# Patient Record
Sex: FEMALE | Race: WHITE | Employment: UNEMPLOYED | URBAN - METROPOLITAN AREA
[De-identification: names, ages, dates, MRNs, and addresses within clinical notes are randomized per-mention and may not be internally consistent; named-entity substitution may affect disease eponyms.]

---
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
## 2020-03-26 ENCOUNTER — VIRTUAL VISIT (OUTPATIENT)
Dept: FAMILY MEDICINE | Facility: OTHER | Age: 20
End: 2020-03-26

## 2020-03-26 NOTE — PROGRESS NOTES
"Date: 2020 11:20:08  Clinician: Hay Murillo  Clinician NPI: [id number]  Patient: Nikki Lopez  Patient : 2000  Patient Address: [facility] [place] [address]  Patient Phone: [phone]  Visit Protocol: URI  Patient Summary:  Nikki is a 19 year old ( : 2000 ) female who initiated a Visit for COVID-19 (Coronavirus) evaluation and screening. When asked the question \"Please sign me up to receive news, health information and promotions from [employer].\", Nikki responded \"Yes\".    Nikki states her symptoms started gradually 7-9 days ago. After her symptoms started, they improved and then got worse again.   Her symptoms consist of rhinitis, wheezing, a sore throat, a cough, nasal congestion, malaise, enlarged lymph nodes, a headache, facial pain or pressure, myalgia, and chills. Nikki also feels feverish.   Symptom details     Nasal secretions: The color of her mucus is clear.    Cough: Nikki coughs every 5-10 minutes and her cough is more bothersome at night. Phlegm does not come into her throat when she coughs. She does not believe her cough is caused by post-nasal drip.     Sore throat: Nikki reports having severe throat pain (7-9 on a 10 point pain scale), does not have exudate on her tonsils, and can swallow liquids. The lymph nodes in her neck are enlarged. A rash has not appeared on the skin since the sore throat started.     Temperature: Her current temperature is 97.5 degrees Fahrenheit.     Wheezing: Nikki has been diagnosed with asthma. The wheezing interferes with her normal daily activities.    Facial pain or pressure: The facial pain or pressure does not feel worse when bending or leaning forward.     Headache: She states the headache is moderate (4-6 on a 10 point pain scale).      Nikki denies having ear pain and teeth pain. She also denies taking antibiotic medication for the symptoms, having recent facial or sinus surgery in the past 60 days, and " having a sinus infection within the past year.   Precipitating events  Within the past week, Nikki has not been exposed to someone with strep throat. She has not recently been exposed to someone with influenza. Nikki has not been in close contact with any high risk individuals.   Pertinent COVID-19 (Coronavirus) information  Nikki has not traveled internationally or to the areas where COVID-19 (Coronavirus) is widespread, including cruise ship travel in the last 14 days before the start of her symptoms.   Nikki has not had a close contact with a laboratory-confirmed COVID-19 patient within 14 days of symptom onset. She has had a close contact with a suspected COVID-19 patient within 14 days of symptom onset. Additional information about contact with COVID-19 (Coronavirus) patient as reported by the patient (free text): Patient travelled to [place], and i was in close contact with him after his return. Patient did online screening for corona, and says he has it, but has not been tested in a laboratory.   Nikki is not a healthcare worker or a  and does not work in a healthcare facility. She does not live with a healthcare worker.   Triage Point(s) temporarily suspended for COVID-19 (Coronavirus) screening  Nikki reported the following symptoms which were previously protocol referral points. These protocol referral points have temporarily been removed for purposes of COVID-19 (Coronavirus) screening.     Difficulty breathing even when resting and can only speak in phrase(s)    Wheezing that keeps Nikki from doing daily activities     Pertinent medical history  Nikki does not get yeast infections when she takes antibiotics.   Nikki does not need a return to work/school note.   Weight: 130 lbs   Nikki does not smoke or use smokeless tobacco.   She denies pregnancy and denies breastfeeding. She has menstruated in the past month.   Weight: 130 lbs    MEDICATIONS: No current  medications, ALLERGIES: NKDA  Clinician Response:  Dear Nikki,  Based on the information provided, you have a viral upper respiratory infection, otherwise known as a cold. Symptoms vary from person to person, but can include sneezing, coughing, a runny nose, sore throat, and headache and range from mild to severe.  Unfortunately, there are no medications that can cure a cold, so treatment is focused on controlling symptoms as much as possible. Most people gradually feel better until symptoms are gone in 1-2 weeks.  Medication information  Because you have a viral infection, antibiotics will not help you get better. Treating a viral infection with antibiotics could actually make you feel worse.  Self care  Steps you can take to be as comfortable as possible:     Rest.    Drink plenty of water and other liquids.    Take a hot shower to loosen congestion    Use throat lozenges.    Gargle with warm salt water (1/4 teaspoon of salt per 8 ounce glass of water).    Suck on frozen items such as popsicles or ice cubes.    Drink hot tea with lemon and honey.    Take a spoonful of honey to reduce your cough.     When to seek care  Please be seen in a clinic or urgent care if new symptoms develop, or symptoms become worse.  Call 911 or go to the emergency room if you feel that your throat is closing off, you suddenly develop a rash, you are unable to swallow fluids, you are drooling, or you are having difficulty breathing.  Additional treatment plan   Based on the information you have provided about potential exposure to Coronavirus (Covid-19) but without any symptoms of the virus (cough, fever, shortness of breath are the most common symptoms), it does not appear you need Coronavirus (COVID-19) testing at this time.   But your possible exposure to Coronavirus means that we do recommend self-isolation for 14 days from the last day you may have been exposed.   What does this mean?  Isolate Yourself:   Isolate yourself at home.    Do Not allow any visitors  Do Not go to work or school  Do Not go to [employer],  centers, shopping, or other public places.  Do Not shake hands.  Avoid close contact with others (hugging, kissing).   Protect Others:   Cover Your Mouth and Nose with a mask, disposable tissue or wash cloth to avoid spreading germs to others.  Wash your hands and face frequently with soap and water.   If you have not developed a cough with fever by day 15 of isolation you are considered uninfected.  Thank you for limiting contact with others, wearing a simple mask to cover your cough, practice good hand hygiene habits and accessing our virtual services where possible to limit the spread of this virus.  For more information about COVID19 and options for caring for yourself at home, please visit the CDC website at https://www.cdc.gov/coronavirus/2019-ncov/about/steps-when-sick.html  For more options for care at [facility], please visit our website at [email or web address]    Diagnosis: Cough  Diagnosis ICD: R05

## 2020-06-30 ENCOUNTER — HOSPITAL ENCOUNTER (INPATIENT)
Facility: CLINIC | Age: 20
LOS: 2 days | Discharge: HOME | End: 2020-07-02
Attending: EMERGENCY MEDICINE | Admitting: HOSPITALIST
Payer: COMMERCIAL

## 2020-06-30 ENCOUNTER — APPOINTMENT (OUTPATIENT)
Dept: CT IMAGING | Facility: CLINIC | Age: 20
End: 2020-06-30
Attending: EMERGENCY MEDICINE
Payer: COMMERCIAL

## 2020-06-30 DIAGNOSIS — R00.0 TACHYCARDIA: ICD-10-CM

## 2020-06-30 DIAGNOSIS — R11.2 NAUSEA AND VOMITING, INTRACTABILITY OF VOMITING NOT SPECIFIED, UNSPECIFIED VOMITING TYPE: ICD-10-CM

## 2020-06-30 DIAGNOSIS — R59.0 LYMPHADENOPATHY, MESENTERIC: ICD-10-CM

## 2020-06-30 DIAGNOSIS — N39.0 URINARY TRACT INFECTION WITH HEMATURIA, SITE UNSPECIFIED: ICD-10-CM

## 2020-06-30 DIAGNOSIS — A41.9 SEPSIS, DUE TO UNSPECIFIED ORGANISM, UNSPECIFIED WHETHER ACUTE ORGAN DYSFUNCTION PRESENT (H): ICD-10-CM

## 2020-06-30 DIAGNOSIS — R31.9 URINARY TRACT INFECTION WITH HEMATURIA, SITE UNSPECIFIED: ICD-10-CM

## 2020-06-30 DIAGNOSIS — D72.829 LEUKOCYTOSIS, UNSPECIFIED TYPE: ICD-10-CM

## 2020-06-30 LAB
ALBUMIN SERPL-MCNC: 4.2 G/DL (ref 3.4–5)
ALBUMIN UR-MCNC: 30 MG/DL
ALBUMIN UR-MCNC: 30 MG/DL
ALP SERPL-CCNC: 76 U/L (ref 40–150)
ALT SERPL W P-5'-P-CCNC: 27 U/L (ref 0–50)
ANION GAP SERPL CALCULATED.3IONS-SCNC: 8 MMOL/L (ref 3–14)
APPEARANCE UR: ABNORMAL
APPEARANCE UR: CLEAR
AST SERPL W P-5'-P-CCNC: 15 U/L (ref 0–35)
BACTERIA #/AREA URNS HPF: ABNORMAL /HPF
BASOPHILS # BLD AUTO: 0 10E9/L (ref 0–0.2)
BASOPHILS NFR BLD AUTO: 0.1 %
BILIRUB SERPL-MCNC: 0.9 MG/DL (ref 0.2–1.3)
BILIRUB UR QL STRIP: NEGATIVE
BILIRUB UR QL STRIP: NEGATIVE
BUN SERPL-MCNC: 14 MG/DL (ref 7–30)
CALCIUM SERPL-MCNC: 9.3 MG/DL (ref 8.5–10.1)
CHLORIDE SERPL-SCNC: 106 MMOL/L (ref 96–110)
CO2 SERPL-SCNC: 23 MMOL/L (ref 20–32)
COLOR UR AUTO: YELLOW
COLOR UR AUTO: YELLOW
CREAT SERPL-MCNC: 0.79 MG/DL (ref 0.5–1)
DIFFERENTIAL METHOD BLD: ABNORMAL
EOSINOPHIL # BLD AUTO: 0.1 10E9/L (ref 0–0.7)
EOSINOPHIL NFR BLD AUTO: 0.2 %
ERYTHROCYTE [DISTWIDTH] IN BLOOD BY AUTOMATED COUNT: 12.3 % (ref 10–15)
GFR SERPL CREATININE-BSD FRML MDRD: >90 ML/MIN/{1.73_M2}
GLUCOSE SERPL-MCNC: 128 MG/DL (ref 70–99)
GLUCOSE UR STRIP-MCNC: NEGATIVE MG/DL
GLUCOSE UR STRIP-MCNC: NEGATIVE MG/DL
HCG SERPL QL: NEGATIVE
HCT VFR BLD AUTO: 41.9 % (ref 35–47)
HGB BLD-MCNC: 14.4 G/DL (ref 11.7–15.7)
HGB UR QL STRIP: ABNORMAL
HGB UR QL STRIP: ABNORMAL
HYALINE CASTS #/AREA URNS LPF: 4 /LPF (ref 0–2)
IMM GRANULOCYTES # BLD: 0.1 10E9/L (ref 0–0.4)
IMM GRANULOCYTES NFR BLD: 0.4 %
INTERPRETATION ECG - MUSE: NORMAL
KETONES UR STRIP-MCNC: 10 MG/DL
KETONES UR STRIP-MCNC: NEGATIVE MG/DL
LACTATE BLD-SCNC: 1.2 MMOL/L (ref 0.7–2)
LEUKOCYTE ESTERASE UR QL STRIP: ABNORMAL
LEUKOCYTE ESTERASE UR QL STRIP: ABNORMAL
LYMPHOCYTES # BLD AUTO: 0.2 10E9/L (ref 0.8–5.3)
LYMPHOCYTES NFR BLD AUTO: 0.9 %
MCH RBC QN AUTO: 29 PG (ref 26.5–33)
MCHC RBC AUTO-ENTMCNC: 34.4 G/DL (ref 31.5–36.5)
MCV RBC AUTO: 85 FL (ref 78–100)
MONOCYTES # BLD AUTO: 0.7 10E9/L (ref 0–1.3)
MONOCYTES NFR BLD AUTO: 2.8 %
MUCOUS THREADS #/AREA URNS LPF: PRESENT /LPF
MUCOUS THREADS #/AREA URNS LPF: PRESENT /LPF
NEUTROPHILS # BLD AUTO: 23.3 10E9/L (ref 1.6–8.3)
NEUTROPHILS NFR BLD AUTO: 95.6 %
NITRATE UR QL: NEGATIVE
NITRATE UR QL: NEGATIVE
NRBC # BLD AUTO: 0 10*3/UL
NRBC BLD AUTO-RTO: 0 /100
PH UR STRIP: 6.5 PH (ref 5–7)
PH UR STRIP: 7.5 PH (ref 5–7)
PLATELET # BLD AUTO: 243 10E9/L (ref 150–450)
POTASSIUM SERPL-SCNC: 3.4 MMOL/L (ref 3.4–5.3)
PROCALCITONIN SERPL-MCNC: 1.38 NG/ML
PROT SERPL-MCNC: 8 G/DL (ref 6.8–8.8)
RBC # BLD AUTO: 4.96 10E12/L (ref 3.8–5.2)
RBC #/AREA URNS AUTO: 14 /HPF (ref 0–2)
RBC #/AREA URNS AUTO: 9 /HPF (ref 0–2)
SODIUM SERPL-SCNC: 137 MMOL/L (ref 133–144)
SOURCE: ABNORMAL
SOURCE: ABNORMAL
SP GR UR STRIP: 1.01 (ref 1–1.03)
SP GR UR STRIP: 1.01 (ref 1–1.03)
SQUAMOUS #/AREA URNS AUTO: 10 /HPF (ref 0–1)
SQUAMOUS #/AREA URNS AUTO: 13 /HPF (ref 0–1)
TSH SERPL DL<=0.005 MIU/L-ACNC: 0.69 MU/L (ref 0.4–4)
UROBILINOGEN UR STRIP-MCNC: NORMAL MG/DL (ref 0–2)
UROBILINOGEN UR STRIP-MCNC: NORMAL MG/DL (ref 0–2)
WBC # BLD AUTO: 24.4 10E9/L (ref 4–11)
WBC #/AREA URNS AUTO: 16 /HPF (ref 0–5)
WBC #/AREA URNS AUTO: 32 /HPF (ref 0–5)

## 2020-06-30 PROCEDURE — 25000128 H RX IP 250 OP 636: Performed by: EMERGENCY MEDICINE

## 2020-06-30 PROCEDURE — 25000125 ZZHC RX 250: Performed by: EMERGENCY MEDICINE

## 2020-06-30 PROCEDURE — 96365 THER/PROPH/DIAG IV INF INIT: CPT | Mod: 59

## 2020-06-30 PROCEDURE — 12000011 ZZH R&B MS OVERFLOW

## 2020-06-30 PROCEDURE — 96376 TX/PRO/DX INJ SAME DRUG ADON: CPT

## 2020-06-30 PROCEDURE — 25800030 ZZH RX IP 258 OP 636: Performed by: EMERGENCY MEDICINE

## 2020-06-30 PROCEDURE — 96361 HYDRATE IV INFUSION ADD-ON: CPT

## 2020-06-30 PROCEDURE — 36415 COLL VENOUS BLD VENIPUNCTURE: CPT | Performed by: HOSPITALIST

## 2020-06-30 PROCEDURE — 25000132 ZZH RX MED GY IP 250 OP 250 PS 637: Performed by: EMERGENCY MEDICINE

## 2020-06-30 PROCEDURE — U0003 INFECTIOUS AGENT DETECTION BY NUCLEIC ACID (DNA OR RNA); SEVERE ACUTE RESPIRATORY SYNDROME CORONAVIRUS 2 (SARS-COV-2) (CORONAVIRUS DISEASE [COVID-19]), AMPLIFIED PROBE TECHNIQUE, MAKING USE OF HIGH THROUGHPUT TECHNOLOGIES AS DESCRIBED BY CMS-2020-01-R: HCPCS | Performed by: EMERGENCY MEDICINE

## 2020-06-30 PROCEDURE — 99285 EMERGENCY DEPT VISIT HI MDM: CPT | Mod: 25

## 2020-06-30 PROCEDURE — 84703 CHORIONIC GONADOTROPIN ASSAY: CPT | Performed by: EMERGENCY MEDICINE

## 2020-06-30 PROCEDURE — 96375 TX/PRO/DX INJ NEW DRUG ADDON: CPT

## 2020-06-30 PROCEDURE — 93005 ELECTROCARDIOGRAM TRACING: CPT

## 2020-06-30 PROCEDURE — 99223 1ST HOSP IP/OBS HIGH 75: CPT | Mod: AI | Performed by: HOSPITALIST

## 2020-06-30 PROCEDURE — 36415 COLL VENOUS BLD VENIPUNCTURE: CPT | Performed by: EMERGENCY MEDICINE

## 2020-06-30 PROCEDURE — 87040 BLOOD CULTURE FOR BACTERIA: CPT | Performed by: EMERGENCY MEDICINE

## 2020-06-30 PROCEDURE — 81001 URINALYSIS AUTO W/SCOPE: CPT | Performed by: EMERGENCY MEDICINE

## 2020-06-30 PROCEDURE — 25000128 H RX IP 250 OP 636

## 2020-06-30 PROCEDURE — 87086 URINE CULTURE/COLONY COUNT: CPT | Performed by: EMERGENCY MEDICINE

## 2020-06-30 PROCEDURE — 25000132 ZZH RX MED GY IP 250 OP 250 PS 637: Performed by: HOSPITALIST

## 2020-06-30 PROCEDURE — 96368 THER/DIAG CONCURRENT INF: CPT

## 2020-06-30 PROCEDURE — 25000128 H RX IP 250 OP 636: Performed by: HOSPITALIST

## 2020-06-30 PROCEDURE — 74177 CT ABD & PELVIS W/CONTRAST: CPT

## 2020-06-30 PROCEDURE — 83605 ASSAY OF LACTIC ACID: CPT | Performed by: HOSPITALIST

## 2020-06-30 PROCEDURE — 85025 COMPLETE CBC W/AUTO DIFF WBC: CPT | Performed by: EMERGENCY MEDICINE

## 2020-06-30 PROCEDURE — 80053 COMPREHEN METABOLIC PANEL: CPT | Performed by: EMERGENCY MEDICINE

## 2020-06-30 PROCEDURE — 84145 PROCALCITONIN (PCT): CPT | Performed by: EMERGENCY MEDICINE

## 2020-06-30 PROCEDURE — C9803 HOPD COVID-19 SPEC COLLECT: HCPCS

## 2020-06-30 PROCEDURE — 84443 ASSAY THYROID STIM HORMONE: CPT | Performed by: EMERGENCY MEDICINE

## 2020-06-30 RX ORDER — ONDANSETRON 2 MG/ML
INJECTION INTRAMUSCULAR; INTRAVENOUS
Status: COMPLETED
Start: 2020-06-30 | End: 2020-06-30

## 2020-06-30 RX ORDER — POTASSIUM CHLORIDE 7.45 MG/ML
10 INJECTION INTRAVENOUS
Status: DISCONTINUED | OUTPATIENT
Start: 2020-06-30 | End: 2020-07-02 | Stop reason: HOSPADM

## 2020-06-30 RX ORDER — ONDANSETRON 2 MG/ML
4 INJECTION INTRAMUSCULAR; INTRAVENOUS ONCE
Status: COMPLETED | OUTPATIENT
Start: 2020-06-30 | End: 2020-06-30

## 2020-06-30 RX ORDER — ONDANSETRON 4 MG/1
4 TABLET, ORALLY DISINTEGRATING ORAL EVERY 6 HOURS PRN
Status: DISCONTINUED | OUTPATIENT
Start: 2020-06-30 | End: 2020-07-02 | Stop reason: HOSPADM

## 2020-06-30 RX ORDER — PROCHLORPERAZINE 25 MG
25 SUPPOSITORY, RECTAL RECTAL EVERY 12 HOURS PRN
Status: DISCONTINUED | OUTPATIENT
Start: 2020-06-30 | End: 2020-07-02 | Stop reason: HOSPADM

## 2020-06-30 RX ORDER — CEFTRIAXONE 1 G/1
1 INJECTION, POWDER, FOR SOLUTION INTRAMUSCULAR; INTRAVENOUS EVERY 24 HOURS
Status: DISCONTINUED | OUTPATIENT
Start: 2020-07-01 | End: 2020-07-02 | Stop reason: HOSPADM

## 2020-06-30 RX ORDER — ACETAMINOPHEN 325 MG/1
975 TABLET ORAL ONCE
Status: COMPLETED | OUTPATIENT
Start: 2020-06-30 | End: 2020-06-30

## 2020-06-30 RX ORDER — ACETAMINOPHEN 325 MG/1
650 TABLET ORAL EVERY 4 HOURS PRN
Status: DISCONTINUED | OUTPATIENT
Start: 2020-06-30 | End: 2020-07-02 | Stop reason: HOSPADM

## 2020-06-30 RX ORDER — CEFTRIAXONE 2 G/1
2 INJECTION, POWDER, FOR SOLUTION INTRAMUSCULAR; INTRAVENOUS ONCE
Status: COMPLETED | OUTPATIENT
Start: 2020-06-30 | End: 2020-06-30

## 2020-06-30 RX ORDER — POTASSIUM CL/LIDO/0.9 % NACL 10MEQ/0.1L
10 INTRAVENOUS SOLUTION, PIGGYBACK (ML) INTRAVENOUS
Status: DISCONTINUED | OUTPATIENT
Start: 2020-06-30 | End: 2020-07-02 | Stop reason: HOSPADM

## 2020-06-30 RX ORDER — POTASSIUM CHLORIDE 1.5 G/1.58G
20-40 POWDER, FOR SOLUTION ORAL
Status: DISCONTINUED | OUTPATIENT
Start: 2020-06-30 | End: 2020-07-02 | Stop reason: HOSPADM

## 2020-06-30 RX ORDER — NALOXONE HYDROCHLORIDE 0.4 MG/ML
.1-.4 INJECTION, SOLUTION INTRAMUSCULAR; INTRAVENOUS; SUBCUTANEOUS
Status: DISCONTINUED | OUTPATIENT
Start: 2020-06-30 | End: 2020-07-02 | Stop reason: HOSPADM

## 2020-06-30 RX ORDER — SODIUM CHLORIDE AND POTASSIUM CHLORIDE 150; 900 MG/100ML; MG/100ML
INJECTION, SOLUTION INTRAVENOUS CONTINUOUS
Status: DISCONTINUED | OUTPATIENT
Start: 2020-06-30 | End: 2020-07-02 | Stop reason: HOSPADM

## 2020-06-30 RX ORDER — PROCHLORPERAZINE MALEATE 5 MG
10 TABLET ORAL EVERY 6 HOURS PRN
Status: DISCONTINUED | OUTPATIENT
Start: 2020-06-30 | End: 2020-07-02 | Stop reason: HOSPADM

## 2020-06-30 RX ORDER — IOPAMIDOL 755 MG/ML
76 INJECTION, SOLUTION INTRAVASCULAR ONCE
Status: COMPLETED | OUTPATIENT
Start: 2020-06-30 | End: 2020-06-30

## 2020-06-30 RX ORDER — ONDANSETRON 2 MG/ML
4 INJECTION INTRAMUSCULAR; INTRAVENOUS EVERY 6 HOURS PRN
Status: DISCONTINUED | OUTPATIENT
Start: 2020-06-30 | End: 2020-07-02 | Stop reason: HOSPADM

## 2020-06-30 RX ORDER — POTASSIUM CHLORIDE 1500 MG/1
20-40 TABLET, EXTENDED RELEASE ORAL
Status: DISCONTINUED | OUTPATIENT
Start: 2020-06-30 | End: 2020-07-02 | Stop reason: HOSPADM

## 2020-06-30 RX ORDER — LIDOCAINE 40 MG/G
CREAM TOPICAL
Status: DISCONTINUED | OUTPATIENT
Start: 2020-06-30 | End: 2020-07-02 | Stop reason: HOSPADM

## 2020-06-30 RX ORDER — POTASSIUM CHLORIDE 29.8 MG/ML
20 INJECTION INTRAVENOUS
Status: DISCONTINUED | OUTPATIENT
Start: 2020-06-30 | End: 2020-07-02 | Stop reason: HOSPADM

## 2020-06-30 RX ADMIN — SODIUM CHLORIDE 1000 ML: 9 INJECTION, SOLUTION INTRAVENOUS at 13:59

## 2020-06-30 RX ADMIN — ONDANSETRON 4 MG: 2 INJECTION INTRAMUSCULAR; INTRAVENOUS at 12:54

## 2020-06-30 RX ADMIN — SODIUM CHLORIDE, POTASSIUM CHLORIDE, SODIUM LACTATE AND CALCIUM CHLORIDE 1000 ML: 600; 310; 30; 20 INJECTION, SOLUTION INTRAVENOUS at 15:32

## 2020-06-30 RX ADMIN — METRONIDAZOLE 500 MG: 500 INJECTION, SOLUTION INTRAVENOUS at 15:39

## 2020-06-30 RX ADMIN — CEFTRIAXONE 2 G: 2 INJECTION, POWDER, FOR SOLUTION INTRAMUSCULAR; INTRAVENOUS at 17:04

## 2020-06-30 RX ADMIN — ONDANSETRON 4 MG: 2 INJECTION INTRAMUSCULAR; INTRAVENOUS at 17:56

## 2020-06-30 RX ADMIN — ACETAMINOPHEN 975 MG: 325 TABLET, FILM COATED ORAL at 13:59

## 2020-06-30 RX ADMIN — SODIUM CHLORIDE 62 ML: 9 INJECTION, SOLUTION INTRAVENOUS at 16:05

## 2020-06-30 RX ADMIN — SODIUM CHLORIDE 1000 ML: 9 INJECTION, SOLUTION INTRAVENOUS at 17:17

## 2020-06-30 RX ADMIN — ACETAMINOPHEN 650 MG: 325 TABLET, FILM COATED ORAL at 20:32

## 2020-06-30 RX ADMIN — SODIUM CHLORIDE 1000 ML: 9 INJECTION, SOLUTION INTRAVENOUS at 12:50

## 2020-06-30 RX ADMIN — POTASSIUM CHLORIDE AND SODIUM CHLORIDE: 900; 150 INJECTION, SOLUTION INTRAVENOUS at 20:32

## 2020-06-30 RX ADMIN — IOPAMIDOL 76 ML: 755 INJECTION, SOLUTION INTRAVENOUS at 16:05

## 2020-06-30 ASSESSMENT — ENCOUNTER SYMPTOMS
FREQUENCY: 0
NAUSEA: 1
VOMITING: 1
CHEST TIGHTNESS: 1
CHILLS: 1
DYSURIA: 0
FEVER: 1
SHORTNESS OF BREATH: 1

## 2020-06-30 ASSESSMENT — MIFFLIN-ST. JEOR: SCORE: 1441.75

## 2020-06-30 NOTE — ED PROVIDER NOTES
History     Chief Complaint:  Nausea & Vomiting and Fever      HPI   Nikki Lopez is a 19 year old female who presents with nausea, emesis, and fever. The patient notes that she woke up today at 0100. She notes that she has been having chills, fever, chills, nausea and emesis. She notes that she is queasy. She notes that her emesis was orange and chunky at first and then turned to acidy. She reports that she has asthma and has slight shortness of breath and chest tightness. She denies any known COVID exposures but that she has travel to [facility]. She denies urinary symptoms.     Allergies:  No known drug allergies     Medications:    Lexapro  Albuterol    Past Medical History:    Uncomplicated asthma  Depression    Past Surgical History:    The patient does not have any pertinent past surgical history.     Family History:    No past pertinent family history.     Social History:  Smoking status: never smoker  Alcohol use: No  Drug use: No  PCP: [facility]  Presents to the ED alone  Up to date on immunization   Marital Status:  Single [1]    Review of Systems   Constitutional: Positive for chills and fever.   Respiratory: Positive for chest tightness and shortness of breath.    Cardiovascular: Negative for chest pain.   Gastrointestinal: Positive for nausea and vomiting.   Genitourinary: Negative for dysuria, frequency and urgency.   All other systems reviewed and are negative.      Physical Exam     Patient Vitals for the past 24 hrs:   BP Temp Temp src Pulse Heart Rate Resp SpO2   06/30/20 1430 98/54 -- -- 110 115 14 100 %   06/30/20 1400 106/71 -- -- 117 137 -- --   06/30/20 1330 110/72 -- -- 130 123 10 98 %   06/30/20 1300 115/73 -- -- 114 116 11 97 %   06/30/20 1231 (!) 124/94 100.9  F (38.3  C) Oral 139 -- 18 97 %        Physical Exam  Constitutional: Alert, attentive, GCS 15  HENT:    Nose: Nose normal.    Mouth/Throat: Oropharynx is clear, mucous membranes are dry  Eyes: EOM are  normal, anicteric, conjugate gaze  CV: tachycardic; no murmurs  Chest: Effort normal and breath sounds clear without wheezing or rales, symmetric bilaterally   GI: mild RLQ tenderness, No distension. No guarding or rebound.    MSK: No LE edema, no tenderness to palpation of BLE.  Neurological: Alert, attentive, moving all extremities equally.   Skin: Skin is warm and dry.     Emergency Department Course     ECG:  ECG taken at 1233, ECG read at 1235  Sinus tachycardia  Right atrial enlargement  Nonspecific ST and T wave abnormality  Abnormal ECG  Vent. rate 133 BPM CO interval 128 ms QRS duration 84 ms QT/QTc 298/443 ms P-R-T axes 78 86 -8.     Imaging:  Radiology findings were communicated with the patient who voiced understanding of the findings.    CT Abdomen/Pelvis w contrast:     CT Abdomen Pelvis w Contrast    (Results Pending)     Laboratory:  Laboratory findings were communicated with the patient who voiced understanding of the findings.    CBC: WBC 24.4(H), HGB 14.4,   CMP: Glucose 128(H) o/w WNL (Creatinine 0.79)  UA with micro: initial sample contaminated by skin, repeat pending  HCG qualitative pregnancy: negative   COVID-19 virus Swab PCR: pending      Blood cultures: pending    Procedures    Interventions:  1250 NS 1L IV Bolus   1254 Zofran 4mg IV   1359 Acetaminophen, 975 mg, PO   1359 NS 1L IV Bolus  1600    LR 1L IV Bolus    Medications   0.9% sodium chloride BOLUS (1,000 mLs Intravenous New Bag 6/30/20 1359)   ondansetron (ZOFRAN) injection 4 mg (4 mg Intravenous Given 6/30/20 1254)   0.9% sodium chloride BOLUS (0 mLs Intravenous Stopped 6/30/20 1359)   acetaminophen (TYLENOL) tablet 975 mg (975 mg Oral Given 6/30/20 1359)       Emergency Department Course:   Nursing notes and vitals reviewed.    1220 I performed an exam of the patient as documented above.     1257 IV was inserted and blood was drawn for laboratory testing, results above.     1304 The patient provided a urine sample here in  the emergency department. This was sent for laboratory testing, findings above.     1600 The patient was sent for a CT Abd Pelvis while in the emergency department, results above.        Impression & Plan      Medical Decision Making:  Nikki Lopez is a 19 year old female with no significant past medical history presenting for proximately 12 hours of fever, vomiting, nausea and loose stools without clear diarrhea.  She was found to be febrile and tachycardic to 140 on arrival concerning for sepsis, labs were sent including blood cultures, white count returned significant elevated at 24 with a left shift certainly concerning for infection.  She does have some mild right-sided including right lower quadrant tenderness concerning for appendicitis however her presentation would be somewhat atypical.  CT scan was ordered, she was given IV ceftriaxone and Flagyl for empiric coverage given her vital signs fever and white count.  She does not have any urinary symptoms and on initial sample, this was contaminated with skin have hesitant to send this as a culture as my suspicion for urinary source is low.   she does not have any chest symptoms including cough, or shortness of breath though she does have a history of asthma.  Lung exam here is clear.  Chest imaging deferred.  Her heart rate was responsive to IV fluids however she remained mildly tachycardic, fever did resolve with Tylenol, pending CT scan results and repeat urine, patient was signed out to Dr. Birmingham.  Should CT scan and urine be negative, and patient feels improve could conceivably go home after she has been covered with a dose of ceftriaxone with low concern for bacteremia.  However, low threshold to admit for continued IVF and continued monitoring.         Covid-19  Nikki Lopez was evaluated during a global COVID-19 pandemic, which necessitated consideration that the patient might be at risk for infection with the SARS-CoV-2 virus that  causes COVID-19.   Applicable protocols for evaluation were followed during the patient's care.   COVID-19 was considered as part of the patient's evaluation. The plan for testing is:  a test was obtained during this visit.       Diagnosis:  Nausea and vomiting  Fever  leukocytosis    Disposition:   Signed out to Dr. Vinnie Gill MD  [employer]  4:27 PM 06/30/20     Scribe Disclosure:  I, Maria Teresa Mohan, am serving as a scribe at 1220 on 6/30/2020 to document services personally performed by Austin Gill MD based on my observations and the provider's statements to me.   EMERGENCY DEPARTMENT       Austin Gill MD  06/30/20 [id number]

## 2020-06-30 NOTE — ED NOTES
[facility]  ED Nurse Handoff Report    ED Chief complaint: Nausea & Vomiting and Fever      ED Diagnosis:   Final diagnoses:   None       Code Status: Full Code    Allergies: No Known Allergies    Patient Story: nausea/ vomiting with fevers  Focused Assessment:  Pt started vomiting early this AM with fevers. Fevers have been fluctuating in the ED today. Pt has had 4L of IVF in ED but still tachy. Pt is still nauseated. Pt to be admitted for further eval     Treatments and/or interventions provided: IVF  Patient's response to treatments and/or interventions: none    To be done/followed up on inpatient unit:  monitor    Does this patient have any cognitive concerns?: none    Activity level - Baseline/Home:  Independent  Activity Level - Current:   Independent    Patient's Preferred language: English   Needed?: No    Isolation: None  Infection: Not Applicable  Bariatric?: No    Vital Signs:   Vitals:    06/30/20 1530 06/30/20 1630 06/30/20 1700 06/30/20 1746   BP: 110/50 107/63 94/66    Pulse: 126      Resp: 15   29   Temp:   98.5  F (36.9  C) 100.7  F (38.2  C)   TempSrc:   Oral Oral   SpO2: 97% 98% 99% 100%       Cardiac Rhythm:Cardiac Rhythm: Sinus tachycardia    Was the PSS-3 completed:   Yes  What interventions are required if any?               Family Comments: none  OBS brochure/video discussed/provided to patient/family: Yes              Name of person given brochure if not patient: pt              Relationship to patient: pt    For the majority of the shift this patient's behavior was Green.   Behavioral interventions performed were none.    ED NURSE PHONE NUMBER: *[phone]

## 2020-06-30 NOTE — H&P
[facility]  History and Physical   Hospitalist  Shlomo Beal MD       Nikki Lopez MRN# [id number]   YOB: 2000 Age: 19 year old      Date of Admission:  6/30/2020         Assessment and Plan:   Nikki Lopez is a 19 year old female with with no significant PMH who presented to ED with nausea vomiting and fever.    Nausea and vomiting  acute febrile illness, likely early sepsis, unclear etiology  abnormal UA  Hypotension  Marked leukocytosis  -will admit as inpatient as no clear source for probable early sepsis  -Tmax 100.9, still tachycardic to 120s with BP 90s/60s and WBC 24, however clinically does not appear septic and feels much better after 4 L IV fluid given in ER  -not hypoxic, CT abdomen noted with prominent mesenteric lymph nodes otherwise unremarkable  -will continue IV fluid with normal saline and KCl at 125 ml per hour  -will check Pro calcitonin; follow blood and urine culture  -no urinary symptoms, UA likely dirty with several squamous cells  -continue empiric Rocephin pending cultures    Rule out COVID  -no risk but given unclear source for fever, will consider symptomatic  -can discontinue isolation and transfer to medical floor if PCR negative    # DVT prophylaxis: low risk for DVT  # Code status: encourage ambulation           Primary Care Physician:   [facility] None         Chief Complaint:   nausea vomiting and fever    History is obtained from the patient         History of Present Illness:     Nikki Lopez is a 19 year old female with with no significant PMH who presented to ED with nausea vomiting and fever. She states starting last night she has had several episodes of nausea and vomiting and unable to tolerate PO along with generalized body ache. Has had temperature up to 101, denies any pain abdomen; did have one episode of loose stool, denies any urinary symptoms.    The patient denies any chills,  rigors, chest pain or shortness of breath.     In ED patient was seen by Dr Birmingham; initial workup was noted with fever, tachycardia, soft BP, marked leukocytosis. She was given 4 L of IV fluids, was given a dose of empiric Rocephin and Flagyl; clinically feels much better but still nauseous and remains tachycardic with soft blood pressure; hospitalist was requested admission for further evaluation.               Past Medical History:     None          Past Surgical History:   History reviewed. No pertinent surgical history.           Home Medications:     None            Allergies:   No Known Allergies         Social History:   Nikki Lopez  reports that she has never smoked. She has never used smokeless tobacco. She reports occasional ETOH use; no ilicit drugs use.              Family History:   Nikki Lopez family history is not on file.    Family history was reviewed by myself and not pertinent to current presentation.           Review of Systems:   A10 point Review of Systems was done and were negative other than noted in the HPI.             Physical Exam:   Blood pressure 94/66, pulse 126, temperature 100.7  F (38.2  C), temperature source Oral, resp. rate 29, SpO2 100 %.  0 lbs 0 oz        Constitutional: Alert, awake and orienetd X 3; lying comfortably in bed in no apparent distress   HEENT: Pupils equal and reactive to light and accomodation, EOMI intact; neck supple no raised JVD or rigidity    Oral cavity:  dry oral mucosa   Cardiovascular: Normal s1 s2, regular rhythm, tachycardic no murmur   Lungs: B/l clear to auscultation, no wheezes or crepitations   Abdomen: Soft, nt, nd, no guarding, rigidity or rebound; BS +   LE : No edema   Musculoskeletal: Power 5/5 in all extremities   Neuro: No focal neurological deficits noted, CN II to XII grossly intact   Psychiatry: normal mood and affect  Skin: No obvious skin rashes or ulcers             Data:   All new lab and imaging data was reviewed  in Epic.   Significant labs and imagings include:    CMP unremarkable including normal LFTs, normal TSH  CBC with WBC 24, hemoglobin 14  UA abnormal with 16 WBC moderate leukocyte esterase but 30 squamous cells  blood culture pending  EKG reviewed by me shows sinus tachycardia  CT abdomen reviewed:  IMPRESSION:   1.  Several prominent mesenteric lymph nodes raise the possibility of  mesenteric lymphadenitis.  2.  No other cause for abdominal pain demonstrated.               Shlomo Beal MD  Hospitalist

## 2020-06-30 NOTE — ED PROVIDER NOTES
Sign Out Note    Pt accepted in sign out from: Dr. Gill    Briefly pt presented to the ED for: Nausea and vomiting for 6 hours.  The patient had slight abdominal pain as well.    Plan at time of sign out: Await CT report and repeat urine analysis.    Care of patient during my shift: I had a face-to-face with the patient.  She reported feeling better after the 3 L of IV fluids.  She is still tachycardic.  Reviewed her CT report with her.  There is generalized adenopathy, but no evidence of appendicitis, cholecystitis, diverticulitis, etc.  The patient has no dysuria, urgency or frequency.  The repeat urine analysis did show some squamous cells again.  The patient denies any urinary tract symptoms, but given the persistence of the leukocyte Estrace, white blood cells, etc. this may represent a UTI.  She had already been provided Rocephin after the 2 blood cultures were obtained along with Flagyl.  The patient denies any vaginal discharge and has no concern for STIs so PID is unlikely.  The patient has SIRS criteria of sepsis, but no definitive bacterial source of infection other than possible UTI.  Given that she is still tachycardic, she will be admitted to the hospital for continued evaluation and treatment.  The patient is feeling better, but given her vitals and her labs she is in agreement with this.  I do not have a definitive source of infection other than possible viral illness causing her GI illness or urinary tract infection without symptoms.  Urine culture has been added on and is pending.  At 1817 I spoke with the hospitalist, Shlomo Beal MD, who has agreed to admit the patient for continued evaluation and treatment.  A procalcitonin has been added on.     Plan for patient at this time: admit to inpatient to [facility],   06/30/20 [id number]

## 2020-07-01 LAB
ANION GAP SERPL CALCULATED.3IONS-SCNC: 7 MMOL/L (ref 3–14)
BACTERIA SPEC CULT: NORMAL
BASOPHILS # BLD AUTO: 0 10E9/L (ref 0–0.2)
BASOPHILS NFR BLD AUTO: 0 %
BUN SERPL-MCNC: 6 MG/DL (ref 7–30)
CALCIUM SERPL-MCNC: 7.8 MG/DL (ref 8.5–10.1)
CHLORIDE SERPL-SCNC: 109 MMOL/L (ref 96–110)
CO2 SERPL-SCNC: 20 MMOL/L (ref 20–32)
CREAT SERPL-MCNC: 0.74 MG/DL (ref 0.5–1)
DIFFERENTIAL METHOD BLD: ABNORMAL
EOSINOPHIL # BLD AUTO: 0.5 10E9/L (ref 0–0.7)
EOSINOPHIL NFR BLD AUTO: 3.9 %
ERYTHROCYTE [DISTWIDTH] IN BLOOD BY AUTOMATED COUNT: 12.6 % (ref 10–15)
GFR SERPL CREATININE-BSD FRML MDRD: >90 ML/MIN/{1.73_M2}
GLUCOSE SERPL-MCNC: 90 MG/DL (ref 70–99)
HCT VFR BLD AUTO: 34.3 % (ref 35–47)
HGB BLD-MCNC: 11.5 G/DL (ref 11.7–15.7)
IMM GRANULOCYTES # BLD: 0 10E9/L (ref 0–0.4)
IMM GRANULOCYTES NFR BLD: 0.3 %
LYMPHOCYTES # BLD AUTO: 0.5 10E9/L (ref 0.8–5.3)
LYMPHOCYTES NFR BLD AUTO: 4.4 %
Lab: NORMAL
MCH RBC QN AUTO: 28.8 PG (ref 26.5–33)
MCHC RBC AUTO-ENTMCNC: 33.5 G/DL (ref 31.5–36.5)
MCV RBC AUTO: 86 FL (ref 78–100)
MONOCYTES # BLD AUTO: 0.6 10E9/L (ref 0–1.3)
MONOCYTES NFR BLD AUTO: 5.2 %
NEUTROPHILS # BLD AUTO: 9.9 10E9/L (ref 1.6–8.3)
NEUTROPHILS NFR BLD AUTO: 86.2 %
NRBC # BLD AUTO: 0 10*3/UL
NRBC BLD AUTO-RTO: 0 /100
PLATELET # BLD AUTO: 185 10E9/L (ref 150–450)
POTASSIUM SERPL-SCNC: 3.7 MMOL/L (ref 3.4–5.3)
RBC # BLD AUTO: 4 10E12/L (ref 3.8–5.2)
SARS-COV-2 PCR COMMENT: NORMAL
SARS-COV-2 RNA SPEC QL NAA+PROBE: NEGATIVE
SARS-COV-2 RNA SPEC QL NAA+PROBE: NORMAL
SODIUM SERPL-SCNC: 136 MMOL/L (ref 133–144)
SPECIMEN SOURCE: NORMAL
WBC # BLD AUTO: 11.5 10E9/L (ref 4–11)

## 2020-07-01 PROCEDURE — 12000000 ZZH R&B MED SURG/OB

## 2020-07-01 PROCEDURE — 99232 SBSQ HOSP IP/OBS MODERATE 35: CPT | Performed by: INTERNAL MEDICINE

## 2020-07-01 PROCEDURE — 36415 COLL VENOUS BLD VENIPUNCTURE: CPT | Performed by: INTERNAL MEDICINE

## 2020-07-01 PROCEDURE — 80048 BASIC METABOLIC PNL TOTAL CA: CPT | Performed by: INTERNAL MEDICINE

## 2020-07-01 PROCEDURE — 25000132 ZZH RX MED GY IP 250 OP 250 PS 637: Performed by: HOSPITALIST

## 2020-07-01 PROCEDURE — 85025 COMPLETE CBC W/AUTO DIFF WBC: CPT | Performed by: INTERNAL MEDICINE

## 2020-07-01 PROCEDURE — 25000128 H RX IP 250 OP 636: Performed by: HOSPITALIST

## 2020-07-01 RX ADMIN — POTASSIUM CHLORIDE AND SODIUM CHLORIDE: 900; 150 INJECTION, SOLUTION INTRAVENOUS at 18:31

## 2020-07-01 RX ADMIN — POTASSIUM CHLORIDE AND SODIUM CHLORIDE: 900; 150 INJECTION, SOLUTION INTRAVENOUS at 04:39

## 2020-07-01 RX ADMIN — POTASSIUM CHLORIDE AND SODIUM CHLORIDE: 900; 150 INJECTION, SOLUTION INTRAVENOUS at 11:54

## 2020-07-01 RX ADMIN — CEFTRIAXONE SODIUM 1 G: 1 INJECTION, POWDER, FOR SOLUTION INTRAMUSCULAR; INTRAVENOUS at 17:56

## 2020-07-01 RX ADMIN — ACETAMINOPHEN 650 MG: 325 TABLET, FILM COATED ORAL at 15:49

## 2020-07-01 ASSESSMENT — ACTIVITIES OF DAILY LIVING (ADL)
ADLS_ACUITY_SCORE: 12
ADLS_ACUITY_SCORE: 12
TRANSFERRING: 0-->INDEPENDENT
SWALLOWING: 0-->SWALLOWS FOODS/LIQUIDS WITHOUT DIFFICULTY
DRESS: 0-->INDEPENDENT
COGNITION: 0 - NO COGNITION ISSUES REPORTED
AMBULATION: 0-->INDEPENDENT
RETIRED_EATING: 0-->INDEPENDENT
ADLS_ACUITY_SCORE: 12
FALL_HISTORY_WITHIN_LAST_SIX_MONTHS: NO
ADLS_ACUITY_SCORE: 12
ADLS_ACUITY_SCORE: 12
BATHING: 0-->INDEPENDENT
RETIRED_COMMUNICATION: 0-->UNDERSTANDS/COMMUNICATES WITHOUT DIFFICULTY
ADLS_ACUITY_SCORE: 17
TOILETING: 0-->INDEPENDENT

## 2020-07-01 NOTE — PLAN OF CARE
Tmax 101.6, A/Ox4, SBA, LS clear equal bilaterally, covid-19 results negative, plan to transfer to medical floor in the AM

## 2020-07-01 NOTE — PLAN OF CARE
A&Ox4. Tachycardic, other VSS. Reports head pressure, declines medication. Tolerating clears, diet advanced. IV infusing. Ambulating ind.

## 2020-07-01 NOTE — PHARMACY-ADMISSION MEDICATION HISTORY
Pharmacy Medication History  Admission medication history interview status for the 6/30/2020  admission is complete. See EPIC admission navigator for prior to admission medications     Medication history sources: Patient  Medication history source reliability: Good  Adherence assessment: N/A    Significant changes made to the medication list:  None      Additional medication history information:   Patient is not taking any PTA medications.    Medication reconciliation completed by provider prior to medication history? No    Time spent in this activity: 5 minutes      Prior to Admission medications    Not on File

## 2020-07-01 NOTE — PROGRESS NOTES
RECEIVING UNIT ED HANDOFF REVIEW    ED Nurse Handoff Report was reviewed by: Amna Burrell RN on June 30, 2020 at 7:30 PM

## 2020-07-01 NOTE — PROGRESS NOTES
[facility]    Hospitalist Progress Note      Assessment & Plan   Nikki Lopez is a 19 year old female without significant past medical history who was admitted on 6/30/2020 with fever, chills and tachycardia.    Sepsis of unclear source: presented with fever to 101.6, WBC count of 24, tachycardia to 130s and procalcitonin of 1.38. CT of abdomen shows mesenteric lymphadenitis but no other acute issues.  Nausea and vomiting from admission resolved on 7/1 without new symptoms.  Responded well to IVF and ceftriaxone. Possibly GI source such as viral gastroenteritis.   -continue to monitor for fever or new symptoms  -follow up blood and urine cultures which are currently no growth  -continue ceftriaxone  -continue IVF  -repeat WBC count tomorrow - down to 11.5 on 7/1    Asymptomatic COVID-19 PCR negative    DVT Prophylaxis: Ambulate every shift  Code Status: Full Code    Disposition: Expected discharge Thursday if cultures remain negative, vitals stable.    Bhanu Perry      Interval History   Feels much better today, just a bit weak.  No shortness of breath, chest pain or cough.  No abdominal pain and nausea has resolved.  Has not had diarrhea.  No sick contacts or exposures that she is aware.  No urinary symptoms.      -Data reviewed today: I reviewed all new labs and imaging results over the last 24 hours. I personally reviewed no images or EKG's today.    Physical Exam   Temp: 98.6  F (37  C) Temp src: Oral BP: 105/57 Pulse: 107 Heart Rate: 110 Resp: 18 SpO2: 98 % O2 Device: None (Room air)    Vitals:    06/30/20 2106   Weight: 65 kg (143 lb 4.8 oz)     Vital Signs with Ranges  Temp:  [98.5  F (36.9  C)-101.6  F (38.7  C)] 98.6  F (37  C)  Pulse:  [107-126] 107  Heart Rate:  [105-124] 110  Resp:  [10-35] 18  BP: ()/(46-66) 105/57  SpO2:  [97 %-100 %] 98 %  No intake/output data recorded.    Constitutional: awake, alert, cooperative    Respiratory: Clear to auscultation  bilaterally, no crackles or wheezing noted.  Good air exchange.     Cardiovascular: Mildly tachycardic.  No murmur, rub or gallop noted.     GI: Positive bowel sounds, soft, non-distended, non-tender.  No masses or organomegaly noted.     Musculoskeletal: Full range of motion.  Tone is normal.  No acute abnormalities.     Neurologic: Awake, alert and oriented to name, place and time.  Cranial nerves II-XII are grossly intact.      Lymphatic: No edema noted    Skin: no rash    Medications     0.9% sodium chloride + KCl 20 mEq/L 125 mL/hr at 07/01/20 1154       cefTRIAXone  1 g Intravenous Q24H     sodium chloride (PF)  3 mL Intracatheter Q8H       Data   Recent Labs   Lab 07/01/20  0828 06/30/20  1243   WBC 11.5* 24.4*   HGB 11.5* 14.4   MCV 86 85    243    137   POTASSIUM 3.7 3.4   CHLORIDE 109 106   CO2 20 23   BUN 6* 14   CR 0.74 0.79   ANIONGAP 7 8   MADHAV 7.8* 9.3   GLC 90 128*   ALBUMIN  --  4.2   PROTTOTAL  --  8.0   BILITOTAL  --  0.9   ALKPHOS  --  76   ALT  --  27   AST  --  15       Recent Results (from the past 24 hour(s))   CT Abdomen Pelvis w Contrast    Narrative    CT ABDOMEN AND PELVIS WITH CONTRAST 6/30/2020 4:19 PM    CLINICAL HISTORY: Fever, white count. Right lower quadrant pain.    TECHNIQUE: CT scan of the abdomen and pelvis was performed following  injection of IV contrast. Multiplanar reformats were obtained. Dose  reduction techniques were used.  CONTRAST: 76 mL Isovue-370    COMPARISON: None.    FINDINGS:   LOWER CHEST: Normal.    HEPATOBILIARY: Normal.    PANCREAS: Normal.    SPLEEN: Normal.    ADRENAL GLANDS: Normal.    KIDNEYS/BLADDER: Normal.    BOWEL: Normal-appearing appendix. No evidence of obstruction.    PELVIC ORGANS: There appears to be a tampon in the vagina.    ADDITIONAL FINDINGS: Several prominent mesenteric lymph nodes. No  ascites or free intraperitoneal air.    MUSCULOSKELETAL: Normal.      Impression    IMPRESSION:   1.  Several prominent mesenteric lymph nodes  raise the possibility of  mesenteric lymphadenitis.  2.  No other cause for abdominal pain demonstrated.    CANDICE FROST MD

## 2020-07-01 NOTE — PROGRESS NOTES
MD Notification    Notified Person: MD    Notified Person Name: Juarez    Notification Date/Time: July 1, 2020, 2:25 AM    Notification Interaction: pgd    Purpose of Notification: pt covid results in, pt is negative, remove precautions?    Orders Received: okay to remove precautions and transfer to medical floor when able    Comments:

## 2020-07-01 NOTE — PROGRESS NOTES
Pt being transferred to station 66. Covid-19 negative. A&O, Ind in room. On IV fluids and IV Rocephin. Denies N&V at this time. Tylenol given for headache.

## 2020-07-01 NOTE — PROGRESS NOTES
"Hospitalist Cross Cover  7/1/2020    Notified of negative COVID-19 PCR test result. Chart reviewed. Discontinue droplet/contact isolation precautions.    /61 (BP Location: Right arm)   Pulse 107   Temp 100.5  F (38.1  C) (Oral)   Resp 18   Ht 1.676 m (5' 6\")   Wt 65 kg (143 lb 4.8 oz)   SpO2 99%   BMI 23.13 kg/m      Ronaldo Pizarro MD        "

## 2020-07-02 VITALS
SYSTOLIC BLOOD PRESSURE: 119 MMHG | HEART RATE: 107 BPM | TEMPERATURE: 98 F | DIASTOLIC BLOOD PRESSURE: 81 MMHG | RESPIRATION RATE: 16 BRPM | WEIGHT: 143.3 LBS | HEIGHT: 66 IN | BODY MASS INDEX: 23.03 KG/M2 | OXYGEN SATURATION: 99 %

## 2020-07-02 LAB
ERYTHROCYTE [DISTWIDTH] IN BLOOD BY AUTOMATED COUNT: 12.6 % (ref 10–15)
HCT VFR BLD AUTO: 33.4 % (ref 35–47)
HGB BLD-MCNC: 11.4 G/DL (ref 11.7–15.7)
MCH RBC QN AUTO: 29.3 PG (ref 26.5–33)
MCHC RBC AUTO-ENTMCNC: 34.1 G/DL (ref 31.5–36.5)
MCV RBC AUTO: 86 FL (ref 78–100)
PLATELET # BLD AUTO: 174 10E9/L (ref 150–450)
RBC # BLD AUTO: 3.89 10E12/L (ref 3.8–5.2)
WBC # BLD AUTO: 7.3 10E9/L (ref 4–11)

## 2020-07-02 PROCEDURE — 99238 HOSP IP/OBS DSCHRG MGMT 30/<: CPT | Performed by: INTERNAL MEDICINE

## 2020-07-02 PROCEDURE — 36415 COLL VENOUS BLD VENIPUNCTURE: CPT | Performed by: INTERNAL MEDICINE

## 2020-07-02 PROCEDURE — 85027 COMPLETE CBC AUTOMATED: CPT | Performed by: INTERNAL MEDICINE

## 2020-07-02 PROCEDURE — 25000128 H RX IP 250 OP 636: Performed by: HOSPITALIST

## 2020-07-02 RX ORDER — CEFDINIR 300 MG/1
300 CAPSULE ORAL 2 TIMES DAILY
Qty: 10 CAPSULE | Refills: 0 | Status: SHIPPED | OUTPATIENT
Start: 2020-07-02 | End: 2020-07-07

## 2020-07-02 RX ADMIN — POTASSIUM CHLORIDE AND SODIUM CHLORIDE: 900; 150 INJECTION, SOLUTION INTRAVENOUS at 02:13

## 2020-07-02 ASSESSMENT — ACTIVITIES OF DAILY LIVING (ADL)
ADLS_ACUITY_SCORE: 12

## 2020-07-02 NOTE — PLAN OF CARE
DATE & TIME: 07/02/2020 3065-8529  Cognitive Concerns/ Orientation : A&Ox4  BEHAVIOR & AGGRESSION TOOL COLOR: Green  ABNL VS/O2: VSS on RA  MOBILITY: Independent  PAIN MANAGMENT: Denies pain  DIET: Regular, fair appetite.   BOWEL/BLADDER: Continent of B/B  ABNL LAB/BG: WBC now 7.3  DRAIN/DEVICES: PIV infusing NS+20K @125/hr  TELEMETRY RHYTHM: N/A  SKIN: WDL  TESTS/PROCEDURES: N/A  D/C DAY/GOALS/PLACE: Today, possibly before 11 pending labs + BC  OTHER IMPORTANT INFO: Denied nausea this shift.

## 2020-07-02 NOTE — PLAN OF CARE
Summary: Arrived to 612-1 from Obs, presented with N/V/Abd pain  DATE & TIME: 7/1/20 0629-0770  Cognitive Concerns/ Orientation : A&Ox4, calm/cooperative/pleasant  BEHAVIOR & AGGRESSION TOOL COLOR: Green  ABNL VS/O2: VSS on RA  MOBILITY: Independent  PAIN MANAGMENT: Tylenol given before transfer to Obs for headache.   DIET: Regular, fair appetite.   BOWEL/BLADDER: Continent of B/B  ABNL LAB/BG: WBC 11.1 (trending down)  DRAIN/DEVICES: PIV infusing NS +020mEq K+ @ 125/hr  TELEMETRY RHYTHM: N/A  SKIN: N/A  TESTS/PROCEDURES: N/A  D/C DAY/GOALS/PLACE: Tomorrow, possibly before 11 pending labs + BC  OTHER IMPORTANT INFO: Ordered AM CBC for WBC, per Dr. Perry's note.       Admission    Patient arrives to room 612 via cart from Obs.  Care plan note: Completed    Inpatient nursing criteria listed below were met:    PCD's Documented: NA  Skin issues/needs documented :NA  Isolation education started/completed NA  Patient allergies verified with patient: Yes  Verified completion of [place] Risk Assessment Tool:  Yes  Verified completion of Guardianship screening tool: Yes  Fall Prevention: Care plan updated, Education given and documented NA  Care Plan initiated: Yes  Home medications documented in belongings flowsheet: NA  Patient belongings documented in belongings flowsheet: Yes  Reminder note (belongings/ medications) placed in discharge instructions:Yes  Admission profile/ required documentation complete: Yes  Bedside Report Letter given and explained to patient Yes

## 2020-07-02 NOTE — PROGRESS NOTES
Discharge    Patient discharged to home via car ride with boyfriend and his mother.  Care plan note completed.    DATE & TIME: 07/02/2020 8254-4554  Cognitive Concerns/ Orientation : A&Ox4  BEHAVIOR & AGGRESSION TOOL COLOR: Green  ABNL VS/O2: VSS on RA  MOBILITY: Independent  PAIN MANAGMENT: Denies  DIET: Regular  BOWEL/BLADDER: Continent of B/B  ABNL LAB/BG:   DRAIN/DEVICES: PIV infused NS+20K @125/hr; removed  TELEMETRY RHYTHM: N/A  SKIN: WDL  TESTS/PROCEDURES: N/A  D/C DAY/GOALS/PLACE: Today, possibly before 11 pending labs + BC  OTHER IMPORTANT INFO: Denied nausea this shift.     Listed belongings gathered and returned to patient. Yes  Care Plan and Patient education resolved: Yes  Prescriptions if needed, hard copies sent with patient  Yes  Home and hospital acquired medications returned to patient: NA  Medication Bin checked and emptied on discharge Yes  Follow up appointment made for patient: No    Patient will complete a 5 day course of oral antibiotics and was educated medication. No upcoming appointments have been made, but was encouraged to follow up with primary care provider soon.

## 2020-07-02 NOTE — PLAN OF CARE
DATE & TIME: 07/02/2020 4500-5621  Cognitive Concerns/ Orientation : A&Ox4  BEHAVIOR & AGGRESSION TOOL COLOR: Green  ABNL VS/O2: VSS on RA  MOBILITY: Independent  PAIN MANAGMENT: Denies  DIET: Regular  BOWEL/BLADDER: Continent of B/B  ABNL LAB/BG:   DRAIN/DEVICES: PIV infused NS+20K @125/hr; removed  TELEMETRY RHYTHM: N/A  SKIN: WDL  TESTS/PROCEDURES: N/A  D/C DAY/GOALS/PLACE: Today, possibly before 11 pending labs + BC  OTHER IMPORTANT INFO: Denied nausea this shift.

## 2020-07-06 LAB
BACTERIA SPEC CULT: NO GROWTH
SPECIMEN SOURCE: NORMAL

## 2020-07-07 LAB
BACTERIA SPEC CULT: NO GROWTH
SPECIMEN SOURCE: NORMAL

## 2021-04-05 ENCOUNTER — IMMUNIZATION (OUTPATIENT)
Dept: LAB | Age: 21
End: 2021-04-05

## 2021-04-05 DIAGNOSIS — Z23 NEED FOR VACCINATION: Primary | ICD-10-CM

## 2021-04-05 PROCEDURE — 91300 COVID 19 PFIZER-BIONTECH: CPT | Performed by: INTERNAL MEDICINE

## 2021-04-05 PROCEDURE — 0001A COVID 19 PFIZER-BIONTECH: CPT | Performed by: INTERNAL MEDICINE

## 2021-04-26 ENCOUNTER — IMMUNIZATION (OUTPATIENT)
Dept: LAB | Age: 21
End: 2021-04-26
Attending: PREVENTIVE MEDICINE

## 2021-04-26 DIAGNOSIS — Z23 NEED FOR VACCINATION: Primary | ICD-10-CM

## 2021-04-26 PROCEDURE — 91300 COVID 19 PFIZER-BIONTECH: CPT | Performed by: INTERNAL MEDICINE

## 2021-04-26 PROCEDURE — 0002A COVID 19 PFIZER-BIONTECH: CPT | Performed by: INTERNAL MEDICINE

## 2024-07-17 ENCOUNTER — WALK IN (OUTPATIENT)
Dept: URGENT CARE | Age: 24
End: 2024-07-17

## 2024-07-17 VITALS
HEART RATE: 94 BPM | OXYGEN SATURATION: 98 % | DIASTOLIC BLOOD PRESSURE: 89 MMHG | HEIGHT: 66 IN | SYSTOLIC BLOOD PRESSURE: 131 MMHG | RESPIRATION RATE: 20 BRPM | TEMPERATURE: 98.7 F

## 2024-07-17 DIAGNOSIS — L23.9 ALLERGIC CONTACT DERMATITIS, UNSPECIFIED TRIGGER: Primary | ICD-10-CM

## 2024-07-17 RX ORDER — TRIAMCINOLONE ACETONIDE 1 MG/G
CREAM TOPICAL 2 TIMES DAILY
Qty: 45 G | Refills: 0 | Status: SHIPPED | OUTPATIENT
Start: 2024-07-17

## 2024-07-17 RX ORDER — CEPHALEXIN 500 MG/1
500 CAPSULE ORAL 4 TIMES DAILY
Qty: 28 CAPSULE | Refills: 0 | Status: SHIPPED | OUTPATIENT
Start: 2024-07-17 | End: 2024-07-24

## 2024-07-17 ASSESSMENT — ENCOUNTER SYMPTOMS
CHILLS: 0
DIZZINESS: 0
COLOR CHANGE: 1
HEADACHES: 0
FEVER: 0
LIGHT-HEADEDNESS: 0

## 2025-07-14 ENCOUNTER — WALK IN (OUTPATIENT)
Dept: URGENT CARE | Age: 25
End: 2025-07-14

## 2025-07-14 VITALS
RESPIRATION RATE: 18 BRPM | OXYGEN SATURATION: 100 % | TEMPERATURE: 99.1 F | HEART RATE: 100 BPM | DIASTOLIC BLOOD PRESSURE: 84 MMHG | SYSTOLIC BLOOD PRESSURE: 126 MMHG

## 2025-07-14 DIAGNOSIS — R11.2 NAUSEA AND VOMITING, UNSPECIFIED VOMITING TYPE: ICD-10-CM

## 2025-07-14 DIAGNOSIS — B34.9 VIRAL ILLNESS: Primary | ICD-10-CM

## 2025-07-14 LAB — SARS-COV-2 RNA RESP QL NAA+PROBE: NOT DETECTED

## 2025-07-14 PROCEDURE — 99214 OFFICE O/P EST MOD 30 MIN: CPT | Performed by: PHYSICIAN ASSISTANT

## 2025-07-14 PROCEDURE — 87635 SARS-COV-2 COVID-19 AMP PRB: CPT | Performed by: PHYSICIAN ASSISTANT

## 2025-07-14 RX ORDER — ONDANSETRON 4 MG/1
4 TABLET, FILM COATED ORAL EVERY 8 HOURS PRN
Qty: 6 TABLET | Refills: 0 | Status: SHIPPED | OUTPATIENT
Start: 2025-07-14 | End: 2025-07-16

## 2025-07-14 RX ORDER — ONDANSETRON 4 MG/1
8 TABLET, ORALLY DISINTEGRATING ORAL ONCE
Status: COMPLETED | OUTPATIENT
Start: 2025-07-14 | End: 2025-07-14

## 2025-07-14 RX ADMIN — ONDANSETRON 8 MG: 4 TABLET, ORALLY DISINTEGRATING ORAL at 11:56
